# Patient Record
Sex: FEMALE | Race: WHITE
[De-identification: names, ages, dates, MRNs, and addresses within clinical notes are randomized per-mention and may not be internally consistent; named-entity substitution may affect disease eponyms.]

---

## 2018-12-21 ENCOUNTER — HOSPITAL ENCOUNTER (OUTPATIENT)
Dept: HOSPITAL 92 - BICRAD | Age: 38
Discharge: HOME | End: 2018-12-21
Attending: FAMILY MEDICINE
Payer: COMMERCIAL

## 2018-12-21 DIAGNOSIS — M79.89: ICD-10-CM

## 2018-12-21 DIAGNOSIS — S93.421A: Primary | ICD-10-CM

## 2018-12-21 NOTE — RAD
RIGHT FOOT:

12/21/18

 

Three views.

 

HISTORY: 

Fall with injury. 

 

The tarsals appear intact. Small enthesophyte from the plantar calcaneus. The tarsometatarsal alignme
nt is normal. Mild degenerative changes noted in the first tarsometatarsal joint. There is articular 
sclerosis and mild hypertrophic change at this joint. 

 

The metatarsals and phalanges appear intact. The MTP and IP joints appear unremarkable. 

 

IMPRESSION:  

No fracture or acute abnormality. Chronic changes as described. 

 

POS: [facility]

## 2018-12-21 NOTE — RAD
RIGHT ANKLE:

12/21/18

 

Three views.

 

HISTORY: 

Fall with injury. 

 

There is swelling seen laterally. No evidence of fracture. Small enthesophyte from the plantar calcan
eus. Small calcific density seen in the soft tissues posterior to the tibiotalar joint one the latera
l view. This may represent a density seen overlying the medial joint space on the frontal view. 

 

IMPRESSION:  

No acute fracture identified. Prominent soft tissue swelling laterally. 

 

POS: [facility]